# Patient Record
Sex: FEMALE | Race: WHITE | NOT HISPANIC OR LATINO | Employment: UNEMPLOYED | URBAN - NONMETROPOLITAN AREA
[De-identification: names, ages, dates, MRNs, and addresses within clinical notes are randomized per-mention and may not be internally consistent; named-entity substitution may affect disease eponyms.]

---

## 2017-07-19 ENCOUNTER — APPOINTMENT (OUTPATIENT)
Dept: LAB | Facility: HOSPITAL | Age: 52
End: 2017-07-19

## 2017-07-19 ENCOUNTER — OFFICE VISIT (OUTPATIENT)
Dept: FAMILY MEDICINE CLINIC | Facility: CLINIC | Age: 52
End: 2017-07-19

## 2017-07-19 VITALS
BODY MASS INDEX: 31.45 KG/M2 | WEIGHT: 184.25 LBS | HEART RATE: 72 BPM | OXYGEN SATURATION: 99 % | DIASTOLIC BLOOD PRESSURE: 80 MMHG | HEIGHT: 64 IN | SYSTOLIC BLOOD PRESSURE: 132 MMHG

## 2017-07-19 DIAGNOSIS — Z92.89 HISTORY OF BLOOD TRANSFUSION: Primary | ICD-10-CM

## 2017-07-19 DIAGNOSIS — Z11.59 NEED FOR HEPATITIS C SCREENING TEST: ICD-10-CM

## 2017-07-19 DIAGNOSIS — J45.20 MILD INTERMITTENT ASTHMA WITHOUT COMPLICATION: ICD-10-CM

## 2017-07-19 LAB
HAV IGM SERPL QL IA: NEGATIVE
HBV CORE IGM SERPL QL IA: NEGATIVE
HBV SURFACE AG SERPL QL IA: NEGATIVE
HCV AB SER DONR QL: NEGATIVE

## 2017-07-19 PROCEDURE — 99202 OFFICE O/P NEW SF 15 MIN: CPT | Performed by: FAMILY MEDICINE

## 2017-07-19 PROCEDURE — 80074 ACUTE HEPATITIS PANEL: CPT | Performed by: FAMILY MEDICINE

## 2017-07-19 PROCEDURE — 36415 COLL VENOUS BLD VENIPUNCTURE: CPT | Performed by: FAMILY MEDICINE

## 2017-07-19 RX ORDER — ALBUTEROL SULFATE 90 UG/1
2 AEROSOL, METERED RESPIRATORY (INHALATION) EVERY 4 HOURS PRN
Qty: 1 INHALER | Refills: 0 | Status: SHIPPED | OUTPATIENT
Start: 2017-07-19

## 2017-07-19 NOTE — PROGRESS NOTES
Subjective   Chief Complaint   Patient presents with   • Labs Only     Wants a Hep C testing done       Gabriela Randolph is a 52 y.o. female who presents for Labs Only (Wants a Hep C testing done)   History of Present Illness:   Patient came in as walk-in today because she wants blood testing for hepatitis C.  She states that she had a blood transfusion when she was 19 and has never been tested for hepatitis.  She does not have any particular reason to think she has hepatitis however her  was recently in the hospital and was tested for it but was negative.  He was told that anybody had ever had a transfusion should be tested for therefore she wants to get tested.  She also requests a 1 month refill on her albuterol.  She will follow up with her PCP for further refills.    The following portions of the patient's history were reviewed and updated as appropriate:problem list, current medications, allergies, past family history, past medical history, past social history and past surgical history    Past Medical History:   Diagnosis Date   • Allergic    • Asthma        Social History   Substance Use Topics   • Smoking status: Never Smoker   • Smokeless tobacco: Never Used   • Alcohol use No       Medications:  Outpatient Medications Prior to Visit   Medication Sig Dispense Refill   • albuterol (VENTOLIN HFA) 108 (90 BASE) MCG/ACT inhaler Inhale 2 puffs Every 4 (Four) Hours As Needed for Wheezing. 1 inhaler 5   • loperamide (IMODIUM A-D) 2 MG tablet Take 1 tablet by mouth 4 (Four) Times a Day As Needed for diarrhea. 30 tablet 0   • ondansetron ODT (ZOFRAN ODT) 8 MG disintegrating tablet Take 1 tablet by mouth Every 8 (Eight) Hours As Needed for nausea or vomiting. 15 tablet 0     No facility-administered medications prior to visit.        No Known Allergies    Review of Systems   Constitutional: Negative for fatigue, fever and unexpected weight change.   HENT: Negative.    Eyes: Negative.    Respiratory:  "Negative for shortness of breath.    Cardiovascular: Negative for chest pain, palpitations and leg swelling.   Gastrointestinal: Negative for abdominal pain, constipation, diarrhea, nausea and vomiting.   Endocrine: Negative.    Genitourinary: Negative.    Musculoskeletal: Negative.    Skin: Negative.    Neurological: Negative.    Psychiatric/Behavioral: Negative for dysphoric mood and sleep disturbance.       Objective   Visit Vitals   • /80 (BP Location: Left arm, Patient Position: Sitting, Cuff Size: Adult)   • Pulse 72   • Ht 64\" (162.6 cm)   • Wt 184 lb 4 oz (83.6 kg)   • LMP 2010 (Within Months)  Comment: Ablation   • SpO2 99%   • Breastfeeding No   • BMI 31.63 kg/m2       Physical Exam   Constitutional: She is oriented to person, place, and time. She appears well-developed and well-nourished. No distress.   HENT:   Head: Normocephalic.   Nose: Nose normal.   Eyes: Conjunctivae are normal.   Neck: Normal range of motion.   Pulmonary/Chest: Effort normal. No respiratory distress.   Abdominal: She exhibits no distension.   Musculoskeletal: Normal range of motion. She exhibits no edema.   Neurological: She is alert and oriented to person, place, and time. No cranial nerve deficit.   Skin: She is not diaphoretic.   Psychiatric: She has a normal mood and affect. Her behavior is normal.   Nursing note and vitals reviewed.      Assessment/Plan   Gabriela Randolph is a 52 y.o. female seen today for the followin. History of blood transfusion  Will check hepatitis panel. She declines any other std/bloodborne pathogen testing today    - Hepatitis panel, acute    2. Need for hepatitis C screening test    - Hepatitis panel, acute    3. Mild intermittent asthma without complication    - albuterol (VENTOLIN HFA) 108 (90 BASE) MCG/ACT inhaler; Inhale 2 puffs Every 4 (Four) Hours As Needed for Wheezing or Shortness of Air.  Dispense: 1 inhaler; Refill: 0              This document has been " electronically signed by Yari Whalen DO on July 19, 2017 3:49 PM

## 2017-07-24 ENCOUNTER — TELEPHONE (OUTPATIENT)
Dept: FAMILY MEDICINE CLINIC | Facility: CLINIC | Age: 52
End: 2017-07-24

## 2017-07-24 NOTE — TELEPHONE ENCOUNTER
Patient was going to find a way to call up here on Friday for results (she doesn't currently have a phone that we can call her on). If she does call, please let her know that hepatitis panel was negative

## 2018-09-07 ENCOUNTER — CLINICAL SUPPORT (OUTPATIENT)
Dept: FAMILY MEDICINE CLINIC | Facility: CLINIC | Age: 53
End: 2018-09-07

## 2018-09-07 DIAGNOSIS — Z23 IMMUNIZATION DUE: Primary | ICD-10-CM

## 2018-09-19 LAB
INDURATION: 0 MM (ref 0–10)
TB SKIN TEST: NORMAL

## 2018-09-19 PROCEDURE — 86580 TB INTRADERMAL TEST: CPT | Performed by: NURSE PRACTITIONER

## 2018-10-05 ENCOUNTER — APPOINTMENT (OUTPATIENT)
Dept: LAB | Facility: HOSPITAL | Age: 53
End: 2018-10-05

## 2018-10-05 PROCEDURE — 80053 COMPREHEN METABOLIC PANEL: CPT | Performed by: NURSE PRACTITIONER

## 2018-10-05 PROCEDURE — 83036 HEMOGLOBIN GLYCOSYLATED A1C: CPT | Performed by: NURSE PRACTITIONER

## 2018-10-05 PROCEDURE — 85025 COMPLETE CBC W/AUTO DIFF WBC: CPT | Performed by: NURSE PRACTITIONER

## 2018-10-05 PROCEDURE — 80061 LIPID PANEL: CPT | Performed by: NURSE PRACTITIONER

## 2018-10-05 PROCEDURE — 84443 ASSAY THYROID STIM HORMONE: CPT | Performed by: NURSE PRACTITIONER

## 2018-10-05 PROCEDURE — 84439 ASSAY OF FREE THYROXINE: CPT | Performed by: NURSE PRACTITIONER
